# Patient Record
Sex: MALE | Race: WHITE | NOT HISPANIC OR LATINO | URBAN - METROPOLITAN AREA
[De-identification: names, ages, dates, MRNs, and addresses within clinical notes are randomized per-mention and may not be internally consistent; named-entity substitution may affect disease eponyms.]

---

## 2019-01-18 ENCOUNTER — WALK IN (OUTPATIENT)
Dept: URGENT CARE | Age: 34
End: 2019-01-18

## 2019-01-18 VITALS
OXYGEN SATURATION: 97 % | HEART RATE: 83 BPM | DIASTOLIC BLOOD PRESSURE: 80 MMHG | SYSTOLIC BLOOD PRESSURE: 120 MMHG | HEIGHT: 71 IN | RESPIRATION RATE: 18 BRPM | TEMPERATURE: 99 F | BODY MASS INDEX: 31.2 KG/M2 | WEIGHT: 222.88 LBS

## 2019-01-18 DIAGNOSIS — J40 BRONCHITIS: ICD-10-CM

## 2019-01-18 DIAGNOSIS — J01.00 ACUTE MAXILLARY SINUSITIS, RECURRENCE NOT SPECIFIED: Primary | ICD-10-CM

## 2019-01-18 PROCEDURE — 99203 OFFICE O/P NEW LOW 30 MIN: CPT | Performed by: NURSE PRACTITIONER

## 2019-01-18 RX ORDER — ALBUTEROL SULFATE 90 UG/1
2 AEROSOL, METERED RESPIRATORY (INHALATION) EVERY 4 HOURS PRN
Qty: 1 INHALER | Refills: 0 | Status: SHIPPED | OUTPATIENT
Start: 2019-01-18

## 2019-01-18 RX ORDER — FLUTICASONE PROPIONATE 50 MCG
2 SPRAY, SUSPENSION (ML) NASAL DAILY
Qty: 16 G | Refills: 12 | Status: SHIPPED | OUTPATIENT
Start: 2019-01-18

## 2019-01-18 RX ORDER — PREDNISONE 20 MG/1
20 TABLET ORAL 2 TIMES DAILY
Qty: 10 TABLET | Refills: 10 | Status: SHIPPED | OUTPATIENT
Start: 2019-01-18 | End: 2019-01-23

## 2019-01-18 RX ORDER — DOXYCYCLINE HYCLATE 100 MG/1
100 CAPSULE ORAL 2 TIMES DAILY
Qty: 20 CAPSULE | Refills: 0 | Status: SHIPPED | OUTPATIENT
Start: 2019-01-18 | End: 2019-01-28

## 2019-01-18 ASSESSMENT — ENCOUNTER SYMPTOMS
DIZZINESS: 1
SORE THROAT: 0
HEMATOLOGIC/LYMPHATIC NEGATIVE: 1
HEADACHES: 1
SWOLLEN GLANDS: 0
ENDOCRINE NEGATIVE: 1
COUGH: 1
ALLERGIC/IMMUNOLOGIC NEGATIVE: 1
EYES NEGATIVE: 1
SINUS PRESSURE: 1
FEVER: 0
VOMITING: 0
SINUS PAIN: 0
CHILLS: 0
APPETITE CHANGE: 0
GASTROINTESTINAL NEGATIVE: 1
PSYCHIATRIC NEGATIVE: 1
WHEEZING: 1
RHINORRHEA: 1
SHORTNESS OF BREATH: 1
FATIGUE: 1
CHEST TIGHTNESS: 1

## 2019-11-14 ENCOUNTER — WALK IN (OUTPATIENT)
Dept: URGENT CARE | Age: 34
End: 2019-11-14

## 2019-11-14 VITALS
HEART RATE: 72 BPM | DIASTOLIC BLOOD PRESSURE: 84 MMHG | RESPIRATION RATE: 16 BRPM | WEIGHT: 230 LBS | HEIGHT: 72 IN | BODY MASS INDEX: 31.15 KG/M2 | SYSTOLIC BLOOD PRESSURE: 136 MMHG | OXYGEN SATURATION: 99 % | TEMPERATURE: 98.6 F

## 2019-11-14 DIAGNOSIS — J02.9 SORE THROAT: Primary | ICD-10-CM

## 2019-11-14 DIAGNOSIS — A08.4 VIRAL GASTROENTERITIS: ICD-10-CM

## 2019-11-14 LAB
INTERNAL PROCEDURAL CONTROLS ACCEPTABLE: YES
S PYO AG THROAT QL IA.RAPID: NEGATIVE

## 2019-11-14 PROCEDURE — X0943 POCT RAPID STREP A: HCPCS | Performed by: NURSE PRACTITIONER

## 2019-11-14 ASSESSMENT — ENCOUNTER SYMPTOMS
CHILLS: 0
NAUSEA: 1
FATIGUE: 0
ACTIVITY CHANGE: 1
HEADACHES: 0
ABDOMINAL PAIN: 1
SORE THROAT: 1
EYES NEGATIVE: 1
VOMITING: 1
FEVER: 0
APPETITE CHANGE: 1
DIARRHEA: 1
RESPIRATORY NEGATIVE: 1

## 2020-05-04 ENCOUNTER — TELEPHONE (OUTPATIENT)
Dept: SCHEDULING | Age: 35
End: 2020-05-04

## 2022-10-19 ENCOUNTER — HOSPITAL ENCOUNTER (OUTPATIENT)
Age: 37
Discharge: HOME | End: 2022-10-19
Payer: OTHER MISCELLANEOUS

## 2022-10-19 ENCOUNTER — APPOINTMENT (OUTPATIENT)
Dept: GENERAL RADIOLOGY | Age: 37
End: 2022-10-19
Attending: NURSE PRACTITIONER
Payer: OTHER MISCELLANEOUS

## 2022-10-19 VITALS
HEART RATE: 70 BPM | RESPIRATION RATE: 18 BRPM | TEMPERATURE: 98 F | SYSTOLIC BLOOD PRESSURE: 137 MMHG | OXYGEN SATURATION: 100 % | DIASTOLIC BLOOD PRESSURE: 98 MMHG

## 2022-10-19 DIAGNOSIS — S61.217A LACERATION OF LEFT LITTLE FINGER WITHOUT FOREIGN BODY WITHOUT DAMAGE TO NAIL, INITIAL ENCOUNTER: Primary | ICD-10-CM

## 2022-10-19 PROCEDURE — 73140 X-RAY EXAM OF FINGER(S): CPT | Performed by: NURSE PRACTITIONER

## 2022-10-19 RX ORDER — CEFADROXIL 500 MG/1
500 CAPSULE ORAL 2 TIMES DAILY
Qty: 14 CAPSULE | Refills: 0 | Status: SHIPPED | OUTPATIENT
Start: 2022-10-19 | End: 2022-10-26

## 2022-10-19 NOTE — ED INITIAL ASSESSMENT (HPI)
Pt c/o lac to L 5th finger after it got caught between 2 double doors at 6pm last night. Approximate 1cm lac noted to L 5th finger. No active bleeding. States cleaned at home with H2O2 and applied bacitracin. States last tetanus approximately 2 yrs ago.

## 2022-10-21 ENCOUNTER — APPOINTMENT (OUTPATIENT)
Dept: OTHER | Facility: HOSPITAL | Age: 37
End: 2022-10-21
Attending: EMERGENCY MEDICINE

## 2022-10-24 ENCOUNTER — APPOINTMENT (OUTPATIENT)
Dept: OTHER | Facility: HOSPITAL | Age: 37
End: 2022-10-24
Attending: EMERGENCY MEDICINE

## (undated) NOTE — LETTER
Date & Time: 10/19/2022, 9:37 AM  Patient: Obdulio Alanis  Encounter Provider(s):    LIU Riggs       To Whom It May Concern:    Lorenza Finch was seen and treated in our department on 10/19/2022. He should not return to work until 10/21/22 otherwise instructed by occupational health.     If you have any questions or concerns, please do not hesitate to call.        _____________________________  Physician/AP[facility] Signature